# Patient Record
Sex: MALE | Race: WHITE | NOT HISPANIC OR LATINO | Employment: UNEMPLOYED | URBAN - METROPOLITAN AREA
[De-identification: names, ages, dates, MRNs, and addresses within clinical notes are randomized per-mention and may not be internally consistent; named-entity substitution may affect disease eponyms.]

---

## 2018-05-07 ENCOUNTER — HISTORICAL (OUTPATIENT)
Dept: LAB | Facility: HOSPITAL | Age: 53
End: 2018-05-07

## 2018-05-07 LAB
CHOLEST SERPL-MCNC: 190 MG/DL (ref 50–200)
CHOLEST/HDLC SERPL: 3 {RATIO} (ref 0–5)
HDLC SERPL-MCNC: 57 MG/DL (ref 35–60)
LDLC SERPL CALC-MCNC: 109 MG/DL (ref 50–140)
TRIGL SERPL-MCNC: 118 MG/DL (ref 30–150)
TSH SERPL-ACNC: 0.91 MIU/ML (ref 0.35–3.75)
VLDLC SERPL CALC-MCNC: 24 MG/DL

## 2019-04-15 ENCOUNTER — HISTORICAL (OUTPATIENT)
Dept: LAB | Facility: HOSPITAL | Age: 54
End: 2019-04-15

## 2019-04-15 LAB
ALBUMIN SERPL-MCNC: 3.8 GM/DL (ref 3.4–5)
ALBUMIN/GLOB SERPL: 1.1 RATIO (ref 1.1–2)
ALP SERPL-CCNC: 102 UNIT/L (ref 46–116)
ALT SERPL-CCNC: 37 UNIT/L (ref 12–78)
AST SERPL-CCNC: 22 UNIT/L (ref 10–37)
BILIRUB SERPL-MCNC: 0.3 MG/DL (ref 0.2–1)
BILIRUBIN DIRECT+TOT PNL SERPL-MCNC: 0.09 MG/DL (ref 0–0.2)
BILIRUBIN DIRECT+TOT PNL SERPL-MCNC: 0.21 MG/DL
BUN SERPL-MCNC: 11 MG/DL (ref 7–18)
CALCIUM SERPL-MCNC: 8.8 MG/DL (ref 8.5–10.1)
CHLORIDE SERPL-SCNC: 104 MMOL/L (ref 98–107)
CHOLEST SERPL-MCNC: 131 MG/DL (ref 50–200)
CHOLEST/HDLC SERPL: 3 {RATIO} (ref 0–5)
CO2 SERPL-SCNC: 31.5 MMOL/L (ref 21–32)
CREAT SERPL-MCNC: 1.06 MG/DL (ref 0.7–1.3)
GLOBULIN SER-MCNC: 3.6 GM/DL (ref 2.4–3.5)
GLUCOSE SERPL-MCNC: 111 MG/DL (ref 74–106)
HDLC SERPL-MCNC: 49 MG/DL (ref 35–60)
LDLC SERPL CALC-MCNC: 59 MG/DL (ref 50–140)
POTASSIUM SERPL-SCNC: 3.9 MMOL/L (ref 3.5–5.1)
PROT SERPL-MCNC: 7.4 GM/DL (ref 6.4–8.2)
SODIUM SERPL-SCNC: 140 MMOL/L (ref 136–145)
TRIGL SERPL-MCNC: 117 MG/DL (ref 30–150)
TSH SERPL-ACNC: 0.8 MIU/ML (ref 0.35–3.75)
VLDLC SERPL CALC-MCNC: 23 MG/DL

## 2020-09-16 ENCOUNTER — HISTORICAL (OUTPATIENT)
Dept: LAB | Facility: HOSPITAL | Age: 55
End: 2020-09-16

## 2020-09-16 LAB
ALBUMIN SERPL-MCNC: 3.4 GM/DL (ref 3.5–5)
ALBUMIN/GLOB SERPL: 1.1 RATIO (ref 1.1–2)
ALP SERPL-CCNC: 119 UNIT/L (ref 40–150)
ALT SERPL-CCNC: 13 UNIT/L (ref 0–55)
AST SERPL-CCNC: 12 UNIT/L (ref 5–34)
BILIRUB SERPL-MCNC: 0.2 MG/DL
BILIRUBIN DIRECT+TOT PNL SERPL-MCNC: 0.1 MG/DL (ref 0–0.5)
BILIRUBIN DIRECT+TOT PNL SERPL-MCNC: 0.1 MG/DL (ref 0–0.8)
BUN SERPL-MCNC: 18 MG/DL (ref 8.4–25.7)
CALCIUM SERPL-MCNC: 9.2 MG/DL (ref 8.4–10.2)
CHLORIDE SERPL-SCNC: 102 MMOL/L (ref 98–107)
CO2 SERPL-SCNC: 28 MMOL/L (ref 22–29)
CREAT SERPL-MCNC: 0.85 MG/DL (ref 0.73–1.18)
GLOBULIN SER-MCNC: 3.1 GM/DL (ref 2.4–3.5)
GLUCOSE SERPL-MCNC: 97 MG/DL (ref 74–100)
POTASSIUM SERPL-SCNC: 3.3 MMOL/L (ref 3.5–5.1)
PROT SERPL-MCNC: 6.5 GM/DL (ref 6.4–8.3)
SODIUM SERPL-SCNC: 139 MMOL/L (ref 136–145)

## 2020-12-07 ENCOUNTER — HISTORICAL (OUTPATIENT)
Dept: ADMINISTRATIVE | Facility: HOSPITAL | Age: 55
End: 2020-12-07

## 2022-04-11 ENCOUNTER — HISTORICAL (OUTPATIENT)
Dept: ADMINISTRATIVE | Facility: HOSPITAL | Age: 57
End: 2022-04-11

## 2022-04-24 VITALS — WEIGHT: 160 LBS | BODY MASS INDEX: 24.25 KG/M2 | HEIGHT: 68 IN

## 2022-05-04 NOTE — HISTORICAL [facility]
This is a historical note converted from [employer]. Formatting and pictures may have been removed.  Please reference [employer] for original formatting and attached multimedia. Date/Time:?12/7/2020 08:33:06  Procedure:?Left?Carpal tunnel injection  Indications:?Therapeutic Indication- decrease pain, increase range of motion and improve quality of life.  RISKS: Possible complications with injection include bleeding, infection (0.01%), tendon rupture, steroid flare, fat pad or soft tissue atrophy, skin depigmentation, and vasovagal response. ( steroid flare treatment is rest, ice, NSAIDS and resolves in 24-36 hrs.)  Consent: Procedure, risks, benefits, and alternatives explained to patient, who voiced understanding and agreed to proceed with procedure. Consent signed and scanned into the medical record. No absolute contraindications ( cellulitis overlying joint, infection, lack of informed consent, allergy to injection medication, molly protein or egg allergy for sodium hyaluronate, or history of steroid flare) or relative contraindications ( brittle or out of control DM HgA1c >10, coagulopathy INR > 3.5, previous joint replacement, or history of AVN.)  Staff:?Dr. Ameya Mei  Description: Time out performed. The patient was prepped using Chlorhexidine scrub after the appropriate?identification of anatomic landmarks.?Sterile needle used (?21 gauge, 1.5 inch)?Topical anesthetic of ethyl chloride was used?5 ml of 1% lidocaine plain with 40 mg of Kenalog injected.  Complications: none  EBL: none  Post procedure:?Patient reports improvement in pain and ROM.Patient is alert, moving all extremities. Good peripheral pulses, no signs of vascular compromise, range of motion intact. Patient tolerated procedure well. Aftercare instructions were given to patient at time of discharge. Relative rest for 3 days- avoiding excessive activity. Place ice on the are for 15 minutes every 4 to 6 hours. Tylenol 1000 mg twice a day or ibuprofen  600 mg three times a day for the next 3-4 days if not on the medications already. Protect the area for the next 1-8 hours if anesthetic was used. Avoid excessive activity for next 3 to 4 weeks. ER precautions for fever, severe joint pain or allergic reaction to other new symptoms related to joint injection.?  ?   Jaiden Dillon?was evaluated at the time of the encounter with?Dr Roberson.? HPI, PE and treatment plan was reviewed.? Treatment plan was reasonable and necessary.??Imaging was reviewed at the time of visit.??

## 2022-05-04 NOTE — HISTORICAL [facility]
This is a historical note converted from [employer]. Formatting and pictures may have been removed.  Please reference [employer] for original formatting and attached multimedia. Chief Complaint  Left hand pain  History of Present Illness  Chief complaint:  Left hand pain  ?  History present illness:  55-year-old right-hand-dominant male?who states approximately a year ago?was using a pick ax to bust up some limestone. ?After that time?he began having?left hand?wrist pain with associated numbness?extending into his palm?thumb index?and long fingers. ?Patient states the pain is been?unremitting?and consistent. ?Nothing seems to make it better or worse.? Patient denies?motor weakness.? Patient Dors is pain?when he pushes on his wrist?volar aspect?at the carpal tunnel.  Review of Systems  Constitutional:?no fever, fatigue, weakness  Eye:?no vision loss, eye redness, drainage, or pain  ENMT:?no sore throat, ear pain, sinus pain/congestion, nasal congestion/drainage  Respiratory:?no cough, no wheezing, no shortness of breath  Cardiovascular:?no chest pain, no palpitations, no edema  Gastrointestinal:?no nausea, vomiting, or diarrhea. No abdominal pain  ?  ?  Physical Exam  Vitals & Measurements  HT:?172.00?cm? WT:?72.570?kg? BMI:?24.53?  GEN: well developed; well nourished, NAD  HEENT: NCAT  RESP: Equal rise and fall of chest; No increased WOB on RA  CV: RRR by peripheral pulse  NEURO: AAOX3; cooperative, GCS 15  Psych: Appropriate Affect  LUE:  Skin is intact  Pain with compression at the carpal tunnel  Tinels positive  Tinels at the elbow is negative  Motor is grossly intact  Sensation is intact to light touch?possibly diminished?to two-point discrimination?over?index and long fingers compared to contralateral side  Warm well-perfused with brisk capillary refill  ?   Imaging:  X-ray left hand/wrist: X-ray left hand and wrist?demonstrate no acute osseous abnormalities. ?No subluxation dislocation.  Assessment/Plan  Pain in left  hand?M79.642  ?Patient has physicals and findings as well as?chief complaint consistent with carpal tunnel syndrome.? We will attempt injection left carpal tunnel today in clinic. ?We will bring the patient back in?6 to 8 weeks?as needed to assess if the injection?has been beneficial or not. ?This will be both diagnostic and therapeutic.? If the injection helps patient can?receive another injection at a later date?when?his pain returns.? If patient does not want to continue with injections discussion can be had with patient regards to operative?and continued nonoperative management.  ?  ?  Jaiden Dillon?was evaluated at the time of the encounter with?Dr Roberson.? HPI, PE and treatment plan was reviewed.? Treatment plan was reasonable and necessary.??Imaging was reviewed at the time of visit.??  ?  Ordered:  Lidocaine inj., 10 mL, form: Soln, Intra-Articular, Once, first dose 12/07/20 8:28:00 CST, stop date 12/07/20 8:28:00 CST  triamcinolone, 40 mg, form: Injection, Intra-Articular, Once, first dose 12/07/20 8:28:00 CST, stop date 12/07/20 8:28:00 CST  Clinic Follow up, *Est. 01/18/21 3:00:00 CST, Order for future visit, Pain in left hand, [facility]  Clinic Follow-up PRN, 12/07/20 8:28:00 CST  Office/Outpatient Visit Level 3 New [address] PC, Pain in left hand, [facility], 12/07/20 8:28:00 CST  XR Hand Left Minimum 3 Views, Routine, 12/07/20 8:19:00 CST, None, Wheelchair, Patient Has IV?, Rad Type, Pain in left hand, Not Scheduled, 12/07/20 8:19:00 CST  ?  Pain in right hand?M79.641  ?  Referrals  Clinic Follow up, *Est. 01/18/21 3:00:00 CST, Order for future visit, Pain in left hand, [facility]  Clinic Follow-up PRN, 12/07/20 8:28:00 CST   Problem List/Past Medical History  Ongoing  Cardiac valve flow  Diabetes  HTN (hypertension)  Hyperlipidemia  Nephrolithiasis  Renal stone  Tobacco user  Historical  No qualifying data  Procedure/Surgical History  Application of short arm splint (forearm to hand);  static (08/16/2016)  Immobilization of Right Hand using Splint (08/16/2016)  Cystoscopy w/ Ureteral Stent (05/21/2016)  Dilation of Right Ureter with Intraluminal Device, Via Natural or Artificial Opening Endoscopic (05/21/2016)  Fluoroscopy of Right Kidney, Ureter and Bladder using High Osmolar Contrast (05/21/2016)  Gallbladder  inguinal hernia repair   Medications  amlodipine 10 mg oral tablet, 10 mg= 1 tab(s), Oral, Daily  aspirin 81 mg oral tablet  clonazePAM 1 mg oral tablet, 1 mg= 1 tab(s), Oral, BID  fluticasone 50 mcg/inh nasal spray, 1 spray(s), Daily  hydrochlorothiazide-lisinopril 25 mg-20 mg oral tablet, 1 tab(s), Oral, Daily  Kenalog-40 injectable suspension, 40 mg= 1 mL, Intra-Articular, Once  Lidocaine 1% 10ml inj, 10 mL, Intra-Articular, Once  lisinopril 40 mg oral tablet, 1/2 tablet, Oral, Daily  mirtazapine 30 mg oral tablet, 30 mg= 1 tab(s), Oral, qPM  Pantoprazole 40 mg ORAL EC-Tablet, 40 mg= 1 tab(s), Oral, Daily  prednisONE 20 mg oral tablet, 20 mg= 1 tab(s), Oral, BID  sertraline 50 mg oral tablet, 50 mg= 1 tab(s), Oral, Daily  simvastatin 20 mg oral tablet, At Bedtime  tamsulosin 0.4 mg oral capsule, At Bedtime  traMADol 50 mg oral tablet, 50 mg= 1 tab(s), Oral, q6hr  Zoloft 50 mg oral tablet, 50 mg= 1 tab(s), Oral, qAM  Allergies  traMADol?(Lightheadedness)  sulfADIAZINE?(Hives)  Social History  Abuse/Neglect  No, No, Yes, 12/07/2020  Alcohol  Never, 05/19/2016  Substance Use  Past, 08/15/2017  Current, Marijuana, Prescription medications, 1-2 times per year, 05/19/2016  Tobacco  10 or more cigarettes (1/2 pack or more)/day in last 30 days, No, 12/07/2020

## 2023-09-17 ENCOUNTER — HOSPITAL ENCOUNTER (INPATIENT)
Facility: HOSPITAL | Age: 58
LOS: 3 days | Discharge: HOME | DRG: 683 | End: 2023-09-20
Attending: EMERGENCY MEDICINE | Admitting: INTERNAL MEDICINE
Payer: MEDICAID

## 2023-09-17 DIAGNOSIS — N17.9 AKI (ACUTE KIDNEY INJURY): Primary | ICD-10-CM

## 2023-09-17 DIAGNOSIS — R07.9 CHEST PAIN: ICD-10-CM

## 2023-09-17 PROCEDURE — 11000001 HC ACUTE MED/SURG PRIVATE ROOM

## 2023-09-17 PROCEDURE — 99285 EMERGENCY DEPT VISIT HI MDM: CPT

## 2023-09-18 LAB
ALBUMIN SERPL-MCNC: 4.7 G/DL (ref 3.4–4.8)
ALBUMIN/GLOB SERPL: 1.2 RATIO (ref 1.1–2)
ALP SERPL-CCNC: 155 UNIT/L (ref 40–150)
ALT SERPL-CCNC: 40 UNIT/L (ref 0–55)
AMPHET UR QL SCN: POSITIVE
APPEARANCE UR: CLEAR
AST SERPL-CCNC: 25 UNIT/L (ref 5–34)
BACTERIA #/AREA URNS AUTO: NORMAL /HPF
BARBITURATE SCN PRESENT UR: NEGATIVE
BASOPHILS # BLD AUTO: 0.03 X10(3)/MCL
BASOPHILS NFR BLD AUTO: 0.3 %
BENZODIAZ UR QL SCN: POSITIVE
BILIRUB SERPL-MCNC: 0.6 MG/DL
BILIRUB UR QL STRIP.AUTO: NEGATIVE
BNP BLD-MCNC: <10 PG/ML
BUN SERPL-MCNC: 45.6 MG/DL (ref 8.4–25.7)
C3 SERPL-MCNC: 124 MG/DL (ref 80–173)
C4 SERPL-MCNC: 48 MG/DL (ref 13–46)
CALCIUM SERPL-MCNC: 9.8 MG/DL (ref 8.8–10)
CANNABINOIDS UR QL SCN: POSITIVE
CHLORIDE SERPL-SCNC: 96 MMOL/L (ref 98–107)
CK SERPL-CCNC: 278 U/L (ref 30–200)
CO2 SERPL-SCNC: 24 MMOL/L (ref 23–31)
COCAINE UR QL SCN: NEGATIVE
COLOR UR: YELLOW
CREAT SERPL-MCNC: 4.11 MG/DL (ref 0.73–1.18)
CREAT UR-MCNC: 110 MG/DL (ref 63–166)
CREAT UR-MCNC: 114.8 MG/DL (ref 63–166)
EOSINOPHIL # BLD AUTO: 0.06 X10(3)/MCL (ref 0–0.9)
EOSINOPHIL NFR BLD AUTO: 0.6 %
ERYTHROCYTE [DISTWIDTH] IN BLOOD BY AUTOMATED COUNT: 13.2 % (ref 11.5–17)
ETHANOL SERPL-MCNC: <10 MG/DL
FENTANYL UR QL SCN: NEGATIVE
GFR SERPLBLD CREATININE-BSD FMLA CKD-EPI: 16 MLS/MIN/1.73/M2
GLOBULIN SER-MCNC: 4 GM/DL (ref 2.4–3.5)
GLUCOSE SERPL-MCNC: 110 MG/DL (ref 82–115)
GLUCOSE UR QL STRIP.AUTO: ABNORMAL
HAV IGM SERPL QL IA: NONREACTIVE
HBV CORE IGM SERPL QL IA: NONREACTIVE
HBV SURFACE AG SERPL QL IA: NONREACTIVE
HCT VFR BLD AUTO: 40.1 % (ref 42–52)
HCV AB SERPL QL IA: NONREACTIVE
HGB BLD-MCNC: 14.1 G/DL (ref 14–18)
HIV 1+2 AB+HIV1 P24 AG SERPL QL IA: NONREACTIVE
IMM GRANULOCYTES # BLD AUTO: 0.04 X10(3)/MCL (ref 0–0.04)
IMM GRANULOCYTES NFR BLD AUTO: 0.4 %
KETONES UR QL STRIP.AUTO: NEGATIVE
LEUKOCYTE ESTERASE UR QL STRIP.AUTO: NEGATIVE
LYMPHOCYTES # BLD AUTO: 2.87 X10(3)/MCL (ref 0.6–4.6)
LYMPHOCYTES NFR BLD AUTO: 30.3 %
MCH RBC QN AUTO: 30.7 PG (ref 27–31)
MCHC RBC AUTO-ENTMCNC: 35.2 G/DL (ref 33–36)
MCV RBC AUTO: 87.2 FL (ref 80–94)
MDMA UR QL SCN: NEGATIVE
MONOCYTES # BLD AUTO: 1.02 X10(3)/MCL (ref 0.1–1.3)
MONOCYTES NFR BLD AUTO: 10.8 %
NEUTROPHILS # BLD AUTO: 5.46 X10(3)/MCL (ref 2.1–9.2)
NEUTROPHILS NFR BLD AUTO: 57.6 %
NITRITE UR QL STRIP.AUTO: NEGATIVE
NRBC BLD AUTO-RTO: 0 %
OPIATES UR QL SCN: NEGATIVE
OSMOLALITY UR: 334 MOSM/KG (ref 300–1300)
PCP UR QL: NEGATIVE
PH UR STRIP.AUTO: 5.5 [PH]
PH UR: 5.5 [PH] (ref 3–11)
PLATELET # BLD AUTO: 275 X10(3)/MCL (ref 130–400)
PMV BLD AUTO: 10.6 FL (ref 7.4–10.4)
POCT GLUCOSE: 166 MG/DL (ref 70–110)
POCT GLUCOSE: 83 MG/DL (ref 70–110)
POTASSIUM SERPL-SCNC: 3.4 MMOL/L (ref 3.5–5.1)
PROT SERPL-MCNC: 8.7 GM/DL (ref 5.8–7.6)
PROT UR QL STRIP.AUTO: ABNORMAL
PROT UR STRIP-MCNC: 17.8 MG/DL
RBC # BLD AUTO: 4.6 X10(6)/MCL (ref 4.7–6.1)
RBC #/AREA URNS AUTO: <5 /HPF
RBC UR QL AUTO: NEGATIVE
SODIUM SERPL-SCNC: 135 MMOL/L (ref 136–145)
SODIUM UR-SCNC: 62 MMOL/L
SP GR UR STRIP.AUTO: 1.01 (ref 1–1.03)
SQUAMOUS #/AREA URNS AUTO: <5 /HPF
TROPONIN I SERPL-MCNC: <0.01 NG/ML (ref 0–0.04)
URINE PROTEIN/CREATININE RATIO (OHS): 0.2
UROBILINOGEN UR STRIP-ACNC: 0.2
WBC # SPEC AUTO: 9.48 X10(3)/MCL (ref 4.5–11.5)
WBC #/AREA URNS AUTO: <5 /HPF

## 2023-09-18 PROCEDURE — 96372 THER/PROPH/DIAG INJ SC/IM: CPT | Performed by: EMERGENCY MEDICINE

## 2023-09-18 PROCEDURE — 21400001 HC TELEMETRY ROOM

## 2023-09-18 PROCEDURE — 63600175 PHARM REV CODE 636 W HCPCS: Performed by: NURSE PRACTITIONER

## 2023-09-18 PROCEDURE — 84484 ASSAY OF TROPONIN QUANT: CPT | Performed by: PHYSICIAN ASSISTANT

## 2023-09-18 PROCEDURE — 83935 ASSAY OF URINE OSMOLALITY: CPT | Performed by: NURSE PRACTITIONER

## 2023-09-18 PROCEDURE — 93005 ELECTROCARDIOGRAM TRACING: CPT

## 2023-09-18 PROCEDURE — 84484 ASSAY OF TROPONIN QUANT: CPT | Performed by: EMERGENCY MEDICINE

## 2023-09-18 PROCEDURE — 93010 ELECTROCARDIOGRAM REPORT: CPT | Mod: ,,, | Performed by: STUDENT IN AN ORGANIZED HEALTH CARE EDUCATION/TRAINING PROGRAM

## 2023-09-18 PROCEDURE — 84300 ASSAY OF URINE SODIUM: CPT | Performed by: NURSE PRACTITIONER

## 2023-09-18 PROCEDURE — 93010 EKG 12-LEAD: ICD-10-PCS | Mod: ,,, | Performed by: STUDENT IN AN ORGANIZED HEALTH CARE EDUCATION/TRAINING PROGRAM

## 2023-09-18 PROCEDURE — 86160 COMPLEMENT ANTIGEN: CPT | Performed by: NURSE PRACTITIONER

## 2023-09-18 PROCEDURE — 80074 ACUTE HEPATITIS PANEL: CPT | Performed by: NURSE PRACTITIONER

## 2023-09-18 PROCEDURE — 82570 ASSAY OF URINE CREATININE: CPT | Performed by: NURSE PRACTITIONER

## 2023-09-18 PROCEDURE — 96360 HYDRATION IV INFUSION INIT: CPT

## 2023-09-18 PROCEDURE — 63600175 PHARM REV CODE 636 W HCPCS: Performed by: EMERGENCY MEDICINE

## 2023-09-18 PROCEDURE — 27000207 HC ISOLATION

## 2023-09-18 PROCEDURE — 80307 DRUG TEST PRSMV CHEM ANLYZR: CPT | Performed by: EMERGENCY MEDICINE

## 2023-09-18 PROCEDURE — 87389 HIV-1 AG W/HIV-1&-2 AB AG IA: CPT | Performed by: NURSE PRACTITIONER

## 2023-09-18 PROCEDURE — 83880 ASSAY OF NATRIURETIC PEPTIDE: CPT | Performed by: EMERGENCY MEDICINE

## 2023-09-18 PROCEDURE — 82077 ASSAY SPEC XCP UR&BREATH IA: CPT | Performed by: EMERGENCY MEDICINE

## 2023-09-18 PROCEDURE — 81001 URINALYSIS AUTO W/SCOPE: CPT | Performed by: EMERGENCY MEDICINE

## 2023-09-18 PROCEDURE — 25000003 PHARM REV CODE 250: Performed by: PHYSICIAN ASSISTANT

## 2023-09-18 PROCEDURE — 85025 COMPLETE CBC W/AUTO DIFF WBC: CPT | Performed by: EMERGENCY MEDICINE

## 2023-09-18 PROCEDURE — 82550 ASSAY OF CK (CPK): CPT | Performed by: EMERGENCY MEDICINE

## 2023-09-18 PROCEDURE — 11000001 HC ACUTE MED/SURG PRIVATE ROOM

## 2023-09-18 PROCEDURE — 80053 COMPREHEN METABOLIC PANEL: CPT | Performed by: EMERGENCY MEDICINE

## 2023-09-18 RX ORDER — TALC
6 POWDER (GRAM) TOPICAL NIGHTLY PRN
Status: DISCONTINUED | OUTPATIENT
Start: 2023-09-18 | End: 2023-09-20 | Stop reason: HOSPADM

## 2023-09-18 RX ORDER — SIMETHICONE 80 MG
1 TABLET,CHEWABLE ORAL 4 TIMES DAILY PRN
Status: DISCONTINUED | OUTPATIENT
Start: 2023-09-18 | End: 2023-09-20 | Stop reason: HOSPADM

## 2023-09-18 RX ORDER — GLUCAGON 1 MG
1 KIT INJECTION
Status: DISCONTINUED | OUTPATIENT
Start: 2023-09-18 | End: 2023-09-20 | Stop reason: HOSPADM

## 2023-09-18 RX ORDER — CLONAZEPAM 1 MG/1
1 TABLET ORAL 2 TIMES DAILY
COMMUNITY

## 2023-09-18 RX ORDER — ONDANSETRON 2 MG/ML
4 INJECTION INTRAMUSCULAR; INTRAVENOUS EVERY 4 HOURS PRN
Status: DISCONTINUED | OUTPATIENT
Start: 2023-09-18 | End: 2023-09-20 | Stop reason: HOSPADM

## 2023-09-18 RX ORDER — SILDENAFIL 50 MG/1
50 TABLET, FILM COATED ORAL DAILY PRN
COMMUNITY

## 2023-09-18 RX ORDER — MAG HYDROX/ALUMINUM HYD/SIMETH 200-200-20
30 SUSPENSION, ORAL (FINAL DOSE FORM) ORAL 4 TIMES DAILY PRN
Status: DISCONTINUED | OUTPATIENT
Start: 2023-09-18 | End: 2023-09-20 | Stop reason: HOSPADM

## 2023-09-18 RX ORDER — POLYETHYLENE GLYCOL 3350 17 G/17G
17 POWDER, FOR SOLUTION ORAL 2 TIMES DAILY PRN
Status: DISCONTINUED | OUTPATIENT
Start: 2023-09-18 | End: 2023-09-20 | Stop reason: HOSPADM

## 2023-09-18 RX ORDER — SODIUM CHLORIDE, SODIUM LACTATE, POTASSIUM CHLORIDE, CALCIUM CHLORIDE 600; 310; 30; 20 MG/100ML; MG/100ML; MG/100ML; MG/100ML
INJECTION, SOLUTION INTRAVENOUS CONTINUOUS
Status: DISCONTINUED | OUTPATIENT
Start: 2023-09-18 | End: 2023-09-20 | Stop reason: HOSPADM

## 2023-09-18 RX ORDER — ATORVASTATIN CALCIUM 20 MG/1
20 TABLET, FILM COATED ORAL NIGHTLY
COMMUNITY

## 2023-09-18 RX ORDER — ASPIRIN 81 MG/1
81 TABLET ORAL DAILY
COMMUNITY

## 2023-09-18 RX ORDER — NALOXONE HCL 0.4 MG/ML
0.02 VIAL (ML) INJECTION
Status: DISCONTINUED | OUTPATIENT
Start: 2023-09-18 | End: 2023-09-20 | Stop reason: HOSPADM

## 2023-09-18 RX ORDER — IBUPROFEN 200 MG
16 TABLET ORAL
Status: DISCONTINUED | OUTPATIENT
Start: 2023-09-18 | End: 2023-09-20 | Stop reason: HOSPADM

## 2023-09-18 RX ORDER — LISINOPRIL AND HYDROCHLOROTHIAZIDE 20; 25 MG/1; MG/1
1 TABLET ORAL DAILY
Status: ON HOLD | COMMUNITY
End: 2023-09-20 | Stop reason: HOSPADM

## 2023-09-18 RX ORDER — IBUPROFEN 200 MG
24 TABLET ORAL
Status: DISCONTINUED | OUTPATIENT
Start: 2023-09-18 | End: 2023-09-20 | Stop reason: HOSPADM

## 2023-09-18 RX ORDER — POTASSIUM CHLORIDE 20 MEQ/1
20 TABLET, EXTENDED RELEASE ORAL ONCE
Status: COMPLETED | OUTPATIENT
Start: 2023-09-18 | End: 2023-09-18

## 2023-09-18 RX ORDER — INSULIN ASPART 100 [IU]/ML
0-10 INJECTION, SOLUTION INTRAVENOUS; SUBCUTANEOUS
Status: DISCONTINUED | OUTPATIENT
Start: 2023-09-18 | End: 2023-09-20 | Stop reason: HOSPADM

## 2023-09-18 RX ORDER — SERTRALINE HYDROCHLORIDE 50 MG/1
50 TABLET, FILM COATED ORAL DAILY
COMMUNITY

## 2023-09-18 RX ORDER — ACETAMINOPHEN 325 MG/1
650 TABLET ORAL EVERY 6 HOURS PRN
Status: DISCONTINUED | OUTPATIENT
Start: 2023-09-18 | End: 2023-09-20 | Stop reason: HOSPADM

## 2023-09-18 RX ORDER — MIRTAZAPINE 30 MG/1
30 TABLET, FILM COATED ORAL NIGHTLY
COMMUNITY

## 2023-09-18 RX ORDER — PROCHLORPERAZINE EDISYLATE 5 MG/ML
5 INJECTION INTRAMUSCULAR; INTRAVENOUS EVERY 6 HOURS PRN
Status: DISCONTINUED | OUTPATIENT
Start: 2023-09-18 | End: 2023-09-20 | Stop reason: HOSPADM

## 2023-09-18 RX ORDER — ZIPRASIDONE MESYLATE 20 MG/ML
20 INJECTION, POWDER, LYOPHILIZED, FOR SOLUTION INTRAMUSCULAR
Status: COMPLETED | OUTPATIENT
Start: 2023-09-18 | End: 2023-09-18

## 2023-09-18 RX ADMIN — SODIUM CHLORIDE, POTASSIUM CHLORIDE, SODIUM LACTATE AND CALCIUM CHLORIDE: 600; 310; 30; 20 INJECTION, SOLUTION INTRAVENOUS at 04:09

## 2023-09-18 RX ADMIN — POTASSIUM CHLORIDE 20 MEQ: 1500 TABLET, EXTENDED RELEASE ORAL at 11:09

## 2023-09-18 RX ADMIN — SODIUM CHLORIDE, POTASSIUM CHLORIDE, SODIUM LACTATE AND CALCIUM CHLORIDE 1000 ML: 600; 310; 30; 20 INJECTION, SOLUTION INTRAVENOUS at 02:09

## 2023-09-18 RX ADMIN — SODIUM CHLORIDE, POTASSIUM CHLORIDE, SODIUM LACTATE AND CALCIUM CHLORIDE 1000 ML: 600; 310; 30; 20 INJECTION, SOLUTION INTRAVENOUS at 01:09

## 2023-09-18 RX ADMIN — ZIPRASIDONE MESYLATE 20 MG: 20 INJECTION, POWDER, LYOPHILIZED, FOR SOLUTION INTRAMUSCULAR at 12:09

## 2023-09-18 RX ADMIN — SODIUM CHLORIDE, POTASSIUM CHLORIDE, SODIUM LACTATE AND CALCIUM CHLORIDE: 600; 310; 30; 20 INJECTION, SOLUTION INTRAVENOUS at 06:09
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
## 2023-09-18 NOTE — NURSING
Nurses Note -- 4 Eyes      9/18/2023   6:12 PM      Skin assessed during: Admit      [x] No Altered Skin Integrity Present    []Prevention Measures Documented      [] Yes- Altered Skin Integrity Present or Discovered   [] LDA Added if Not in Epic (Describe Wound)   [] New Altered Skin Integrity was Present on Admit and Documented in LDA   [] Wound Image Taken    Wound Care Consulted? No    Attending Nurse:  Christianne Abdul RN    Second RN/Staff Member:   Jeremy Pang RN

## 2023-09-19 LAB
ALBUMIN SERPL-MCNC: 3.3 G/DL (ref 3.4–4.8)
ALBUMIN/GLOB SERPL: 1.3 RATIO (ref 1.1–2)
ALP SERPL-CCNC: 108 UNIT/L (ref 40–150)
ALT SERPL-CCNC: 25 UNIT/L (ref 0–55)
AST SERPL-CCNC: 19 UNIT/L (ref 5–34)
BASOPHILS # BLD AUTO: 0.04 X10(3)/MCL
BASOPHILS NFR BLD AUTO: 0.6 %
BILIRUB SERPL-MCNC: 0.6 MG/DL
BUN SERPL-MCNC: 23.6 MG/DL (ref 8.4–25.7)
CALCIUM SERPL-MCNC: 9.1 MG/DL (ref 8.8–10)
CHLORIDE SERPL-SCNC: 104 MMOL/L (ref 98–107)
CLOSTRIDIUM DIFFICILE GDH ANTIGEN (OHS): NEGATIVE
CLOSTRIDIUM DIFFICILE TOXIN A/B (OHS): NEGATIVE
CO2 SERPL-SCNC: 29 MMOL/L (ref 23–31)
COLOR STL: NORMAL
CONSISTENCY STL: NORMAL
CREAT SERPL-MCNC: 0.87 MG/DL (ref 0.73–1.18)
EOSINOPHIL # BLD AUTO: 0.13 X10(3)/MCL (ref 0–0.9)
EOSINOPHIL NFR BLD AUTO: 2 %
ERYTHROCYTE [DISTWIDTH] IN BLOOD BY AUTOMATED COUNT: 13.4 % (ref 11.5–17)
FECAL LEUKOCYTE (OHS): NEGATIVE
GFR SERPLBLD CREATININE-BSD FMLA CKD-EPI: >60 MLS/MIN/1.73/M2
GLOBULIN SER-MCNC: 2.5 GM/DL (ref 2.4–3.5)
GLUCOSE SERPL-MCNC: 97 MG/DL (ref 82–115)
HCT VFR BLD AUTO: 33.4 % (ref 42–52)
HEMOCCULT SP1 STL QL: NEGATIVE
HGB BLD-MCNC: 11.3 G/DL (ref 14–18)
IMM GRANULOCYTES # BLD AUTO: 0.02 X10(3)/MCL (ref 0–0.04)
IMM GRANULOCYTES NFR BLD AUTO: 0.3 %
LYMPHOCYTES # BLD AUTO: 2.31 X10(3)/MCL (ref 0.6–4.6)
LYMPHOCYTES NFR BLD AUTO: 34.8 %
MAGNESIUM SERPL-MCNC: 1.6 MG/DL (ref 1.6–2.6)
MCH RBC QN AUTO: 30.2 PG (ref 27–31)
MCHC RBC AUTO-ENTMCNC: 33.8 G/DL (ref 33–36)
MCV RBC AUTO: 89.3 FL (ref 80–94)
MONOCYTES # BLD AUTO: 0.5 X10(3)/MCL (ref 0.1–1.3)
MONOCYTES NFR BLD AUTO: 7.5 %
NEUTROPHILS # BLD AUTO: 3.64 X10(3)/MCL (ref 2.1–9.2)
NEUTROPHILS NFR BLD AUTO: 54.8 %
NRBC BLD AUTO-RTO: 0 %
PHOSPHATE SERPL-MCNC: 2.5 MG/DL (ref 2.3–4.7)
PLATELET # BLD AUTO: 217 X10(3)/MCL (ref 130–400)
PMV BLD AUTO: 11.4 FL (ref 7.4–10.4)
POCT GLUCOSE: 107 MG/DL (ref 70–110)
POCT GLUCOSE: 111 MG/DL (ref 70–110)
POCT GLUCOSE: 85 MG/DL (ref 70–110)
POCT GLUCOSE: 96 MG/DL (ref 70–110)
POTASSIUM SERPL-SCNC: 4.3 MMOL/L (ref 3.5–5.1)
PROT SERPL-MCNC: 5.8 GM/DL (ref 5.8–7.6)
RBC # BLD AUTO: 3.74 X10(6)/MCL (ref 4.7–6.1)
SODIUM SERPL-SCNC: 139 MMOL/L (ref 136–145)
WBC # SPEC AUTO: 6.64 X10(3)/MCL (ref 4.5–11.5)

## 2023-09-19 PROCEDURE — 25000003 PHARM REV CODE 250: Performed by: INTERNAL MEDICINE

## 2023-09-19 PROCEDURE — 85025 COMPLETE CBC W/AUTO DIFF WBC: CPT | Performed by: NURSE PRACTITIONER

## 2023-09-19 PROCEDURE — 21400001 HC TELEMETRY ROOM

## 2023-09-19 PROCEDURE — 83735 ASSAY OF MAGNESIUM: CPT | Performed by: NURSE PRACTITIONER

## 2023-09-19 PROCEDURE — 63600175 PHARM REV CODE 636 W HCPCS: Performed by: INTERNAL MEDICINE

## 2023-09-19 PROCEDURE — 27000207 HC ISOLATION

## 2023-09-19 PROCEDURE — 89055 LEUKOCYTE ASSESSMENT FECAL: CPT | Performed by: PHYSICIAN ASSISTANT

## 2023-09-19 PROCEDURE — 86318 IA INFECTIOUS AGENT ANTIBODY: CPT | Performed by: PHYSICIAN ASSISTANT

## 2023-09-19 PROCEDURE — 84100 ASSAY OF PHOSPHORUS: CPT | Performed by: NURSE PRACTITIONER

## 2023-09-19 PROCEDURE — 82272 OCCULT BLD FECES 1-3 TESTS: CPT | Performed by: PHYSICIAN ASSISTANT

## 2023-09-19 PROCEDURE — 25000003 PHARM REV CODE 250: Performed by: NURSE PRACTITIONER

## 2023-09-19 PROCEDURE — 87045 FECES CULTURE AEROBIC BACT: CPT | Performed by: PHYSICIAN ASSISTANT

## 2023-09-19 PROCEDURE — 80053 COMPREHEN METABOLIC PANEL: CPT | Performed by: NURSE PRACTITIONER

## 2023-09-19 RX ORDER — CLONAZEPAM 1 MG/1
1 TABLET ORAL 2 TIMES DAILY
Status: DISCONTINUED | OUTPATIENT
Start: 2023-09-19 | End: 2023-09-20 | Stop reason: HOSPADM

## 2023-09-19 RX ORDER — ASPIRIN 81 MG/1
81 TABLET ORAL DAILY
Status: DISCONTINUED | OUTPATIENT
Start: 2023-09-20 | End: 2023-09-20 | Stop reason: HOSPADM

## 2023-09-19 RX ORDER — SERTRALINE HYDROCHLORIDE 50 MG/1
50 TABLET, FILM COATED ORAL DAILY
Status: DISCONTINUED | OUTPATIENT
Start: 2023-09-20 | End: 2023-09-20 | Stop reason: HOSPADM

## 2023-09-19 RX ORDER — ATORVASTATIN CALCIUM 10 MG/1
20 TABLET, FILM COATED ORAL NIGHTLY
Status: DISCONTINUED | OUTPATIENT
Start: 2023-09-19 | End: 2023-09-20 | Stop reason: HOSPADM

## 2023-09-19 RX ORDER — MIRTAZAPINE 15 MG/1
30 TABLET, FILM COATED ORAL NIGHTLY
Status: DISCONTINUED | OUTPATIENT
Start: 2023-09-19 | End: 2023-09-20 | Stop reason: HOSPADM

## 2023-09-19 RX ADMIN — ATORVASTATIN CALCIUM 20 MG: 10 TABLET, FILM COATED ORAL at 09:09

## 2023-09-19 RX ADMIN — SODIUM CHLORIDE, POTASSIUM CHLORIDE, SODIUM LACTATE AND CALCIUM CHLORIDE: 600; 310; 30; 20 INJECTION, SOLUTION INTRAVENOUS at 12:09

## 2023-09-19 RX ADMIN — SODIUM CHLORIDE, POTASSIUM CHLORIDE, SODIUM LACTATE AND CALCIUM CHLORIDE: 600; 310; 30; 20 INJECTION, SOLUTION INTRAVENOUS at 09:09

## 2023-09-19 RX ADMIN — MIRTAZAPINE 30 MG: 15 TABLET, FILM COATED ORAL at 09:09

## 2023-09-19 RX ADMIN — TRAMADOL HYDROCHLORIDE 25 MG: 50 TABLET, COATED ORAL at 12:09

## 2023-09-19 RX ADMIN — ALUMINUM HYDROXIDE, MAGNESIUM HYDROXIDE, AND SIMETHICONE 30 ML: 200; 200; 20 SUSPENSION ORAL at 12:09

## 2023-09-19 RX ADMIN — SODIUM CHLORIDE, POTASSIUM CHLORIDE, SODIUM LACTATE AND CALCIUM CHLORIDE: 600; 310; 30; 20 INJECTION, SOLUTION INTRAVENOUS at 10:09

## 2023-09-19 RX ADMIN — CLONAZEPAM 1 MG: 1 TABLET ORAL at 09:09

## 2023-09-19 NOTE — PLAN OF CARE
09/19/23 1348   Discharge Assessment   Assessment Type Discharge Planning Assessment   Confirmed/corrected address, phone number and insurance Yes   Confirmed Demographics Correct on Facesheet   Source of Information patient   Communicated JOSHUA with patient/caregiver Date not available/Unable to determine   Reason For Admission TORI, chest pain   People in Home significant other   Do you expect to return to your current living situation? Yes   Prior to hospitilization cognitive status: Alert/Oriented   Current cognitive status: Alert/Oriented   Home Accessibility stairs to enter home   Number of Stairs, Main Entrance four   Stair Railings, Main Entrance railings safe and in good condition   Home Layout Able to live on 1st floor   Equipment Currently Used at Home none   Readmission within 30 days? No   Patient currently being followed by outpatient case management? No   Do you currently have service(s) that help you manage your care at home? No   Do you take prescription medications? Yes   Do you have prescription coverage? Yes   Coverage Medicaid   Do you have any problems affording any of your prescribed medications? No   Is the patient taking medications as prescribed? yes   Who is going to help you get home at discharge? friend   How do you get to doctors appointments? car, drives self   Are you on dialysis? No   Do you take coumadin? No   DME Needed Upon Discharge  none   Discharge Plan discussed with: Patient   Transition of Care Barriers None   Discharge Plan A Home   Discharge Plan B Home   OTHER   Name(s) of People in Home Kim Yeh

## 2023-09-19 NOTE — PLAN OF CARE
Problem: Adult Inpatient Plan of Care  Goal: Plan of Care Review  Outcome: Ongoing, Progressing  Flowsheets (Taken 9/19/2023 1702)  Plan of Care Reviewed With: patient  Goal: Absence of Hospital-Acquired Illness or Injury  Outcome: Ongoing, Progressing  Intervention: Identify and Manage Fall Risk  Flowsheets (Taken 9/19/2023 1702)  Safety Promotion/Fall Prevention:   assistive device/personal item within reach   commode/urinal/bedpan at bedside   diversional activities provided   Fall Risk reviewed with patient/family   high risk medications identified   lighting adjusted   medications reviewed   nonskid shoes/socks when out of bed   side rails raised x 2  Intervention: Prevent Skin Injury  Flowsheets (Taken 9/19/2023 1702)  Skin Protection:   adhesive use limited   incontinence pads utilized   tubing/devices free from skin contact   transparent dressing maintained  Intervention: Prevent and Manage VTE (Venous Thromboembolism) Risk  Flowsheets (Taken 9/19/2023 1702)  VTE Prevention/Management:   ambulation promoted   bleeding precations maintained   bleeding risk assessed   bleeding risk factor(s) identified, provider notified   fluids promoted   dorsiflexion/plantar flexion performed   intravenous hydration  Range of Motion: active ROM (range of motion) encouraged  Intervention: Prevent Infection  Flowsheets (Taken 9/19/2023 1702)  Infection Prevention:   cohorting utilized   environmental surveillance performed   equipment surfaces disinfected   hand hygiene promoted   personal protective equipment utilized   rest/sleep promoted   single patient room provided  Goal: Optimal Comfort and Wellbeing  Outcome: Ongoing, Progressing  Intervention: Monitor Pain and Promote Comfort  Flowsheets (Taken 9/19/2023 1702)  Pain Management Interventions:   pain management plan reviewed with patient/caregiver   pillow support provided   quiet environment facilitated   relaxation techniques promoted  Intervention: Provide  Person-Centered Care  Flowsheets (Taken 9/19/2023 1702)  Trust Relationship/Rapport:   care explained   choices provided   questions answered   questions encouraged   reassurance provided   thoughts/feelings acknowledged     Problem: Infection  Goal: Absence of Infection Signs and Symptoms  Outcome: Ongoing, Progressing  Intervention: Prevent or Manage Infection  Flowsheets (Taken 9/19/2023 1702)  Infection Management: aseptic technique maintained  Isolation Precautions: enhanced contact

## 2023-09-19 NOTE — PROGRESS NOTES
Hospital Medicine  Progress Note    Patient Name: Jaiden Dillon  MRN: [id number]  Status: IP- Inpatient   Admission Date: 9/17/2023  Length of Stay: 1  Date of Service: 09/19/2023       CC: hospital follow-up for TORI       SUBJECTIVE   58 y.o. male with a history of HTN, DM, kidney stones, and anxiety presented to [facility] on 9/17 for generalized weakness, intermittent hypotension, shortness of breath, and chest pain.  Evaluation in ED noted patient hypotensive and tachycardic.  Labs noted BUN 45, creatinine 4.11, , and troponin undetectable.  UDS was positive for benzodiazepines, amphetamines, and cannabinoids.  UA revealed trace protein and glucose otherwise unremarkable.  EKG revealed heart rate of NSR at 86 bpm.  Patient was admitted to hospital medicine and initiated on IV fluid hydration.    Today: Patient seen and examined at bedside, and chart reviewed.  Renal function improved today, pressures in low normal range.      MEDICATIONS   Scheduled   [START ON 9/20/2023] aspirin  81 mg Oral Daily    atorvastatin  20 mg Oral QHS    clonazePAM  1 mg Oral BID    mirtazapine  30 mg Oral QHS    [START ON 9/20/2023] sertraline  50 mg Oral Daily     Continuous Infusions   lactated ringers 150 mL/hr at 09/19/23 1038         PHYSICAL EXAM   VITALS: T 97.5 °F (36.4 °C)   /72   P 72   RR 18   O2 99 %    GENERAL: Awake and in NAD  LUNGS: CTA B/L  CVS: Normal rate  GI/: Soft, NT, bowel sounds positive.  EXTREMITIES: No peripheral edema  NEURO: AAOx3  PSYCH: Cooperative      LABS   CBC  Recent Labs     09/18/23  0103 09/19/23  0521   WBC 9.48 6.64   RBC 4.60* 3.74*   HGB 14.1 11.3*   HCT 40.1* 33.4*   MCV 87.2 89.3   MCH 30.7 30.2   MCHC 35.2 33.8   RDW 13.2 13.4    217     CHEM  Recent Labs     09/18/23  0103 09/19/23  0521   * 139   K 3.4* 4.3   CHLORIDE 96* 104   CO2 24 29   BUN 45.6* 23.6   CREATININE 4.11* 0.87   GLUCOSE 110 97   CALCIUM 9.8 9.1   MG  --  1.60   PHOS  --  2.5   ALBUMIN 4.7  3.3*   GLOBULIN 4.0* 2.5   ALKPHOS 155* 108   ALT 40 25   AST 25 19   BILITOT 0.6 0.6         MICROBIOLOGY     Microbiology Results (last 7 days)       Procedure Component Value Units Date/Time    Clostridium Diff Toxin, A & B, EIA [[id number]]  (Normal) Collected: 09/19/23 0924    Order Status: Completed Specimen: Stool Updated: 09/19/23 1132     Clostridium Difficile GDH Antigen Negative     Clostridium Difficile Toxin A/B Negative    Stool Culture [[id number]] Collected: 09/19/23 0915    Order Status: Sent Specimen: Stool Updated: 09/19/23 1027              DIAGNOSTICS   US Retroperitoneal Complete  FINDINGS  Exam quality: adequate for evaluation  Right Kidney: The right kidney measures 10.3 cm x 4.4 cm x 5 cm. The right renal sinus echopattern is within normal limits. The corticomedullary junction distinction is intact. There are multiple tiny echogenic foci in the right kidney, which may reflect nonobstructing stones versus artifact. No cysts, masses or hydronephrosis is identified. Left Kidney: The left kidney measures 10 cm x 5.5 cm x 5.1 cm. The left renal sinus echopattern is within normal limits. The corticomedullary junction distinction is intact. Multiple echogenic foci are seen in the left kidney, the largest measures 4 mm in the mid pole, resembling nonobstructing stones versus artifact. No cysts, masses or hydronephrosis is identified. Urinary bladder: Pre-void volume measures 563 ml. The urinary bladder is distended. Bilateral ureteral jets are not demonstrated on the submitted images. Prostate: Unremarkable sonographic appearance.  Measures 4.2 cm x 4.3 cm x 2.5 cm. Miscellaneous: The visualized abdominal aorta appears unremarkable and measures 1.9 cm and 1.8 cm proximal and mid segments; the distal abdominal aorta is obscured by bowel gas. The inferior vena cava is unremarkable.  IMPRESSION  1. Findings suggestive of multiple left nonobstructing nephrolithiasis measuring up to 4 mm.  2. Possible  punctate nonobstructing right nephrolithiasis versus artifact.  3. Additional secondary details discussed above.  Electronically signed by: Tim Gilmore  Date:    09/18/2023  Time:    07:38      ASSESSMENT   TORI s/t IVVD  Hypokalemia  Gastroenteritis  Polysubstance abuse   Non-obstructive nephro-/ureterolithiasis  h/o essential hypertension  NIDDM II  h/o anxiety    PLAN   Continue with hydration and monitoring  Home medications reviewed and resumed as deemed appropriate  Continue to hold antihypertensives  Repeat labs in AM  Will re-assess then for possible discharge home        Prophylaxis: B/L SCDs        Freddy Bhatia MD  [facility]

## 2023-09-20 VITALS
RESPIRATION RATE: 18 BRPM | OXYGEN SATURATION: 98 % | DIASTOLIC BLOOD PRESSURE: 74 MMHG | WEIGHT: 135 LBS | TEMPERATURE: 98 F | BODY MASS INDEX: 20.46 KG/M2 | HEART RATE: 75 BPM | HEIGHT: 68 IN | SYSTOLIC BLOOD PRESSURE: 117 MMHG

## 2023-09-20 PROBLEM — N17.9 AKI (ACUTE KIDNEY INJURY): Status: ACTIVE | Noted: 2023-09-20

## 2023-09-20 PROBLEM — N17.9 AKI (ACUTE KIDNEY INJURY): Status: RESOLVED | Noted: 2023-09-20 | Resolved: 2023-09-20

## 2023-09-20 LAB
ANION GAP SERPL CALC-SCNC: 7 MEQ/L
BUN SERPL-MCNC: 16.3 MG/DL (ref 8.4–25.7)
CALCIUM SERPL-MCNC: 9.5 MG/DL (ref 8.8–10)
CHLORIDE SERPL-SCNC: 106 MMOL/L (ref 98–107)
CO2 SERPL-SCNC: 27 MMOL/L (ref 23–31)
CREAT SERPL-MCNC: 0.8 MG/DL (ref 0.73–1.18)
CREAT/UREA NIT SERPL: 20
GFR SERPLBLD CREATININE-BSD FMLA CKD-EPI: >60 MLS/MIN/1.73/M2
GLUCOSE SERPL-MCNC: 86 MG/DL (ref 82–115)
PATH REV: NORMAL
POTASSIUM SERPL-SCNC: 4.8 MMOL/L (ref 3.5–5.1)
SODIUM SERPL-SCNC: 140 MMOL/L (ref 136–145)

## 2023-09-20 PROCEDURE — 80048 BASIC METABOLIC PNL TOTAL CA: CPT | Performed by: INTERNAL MEDICINE

## 2023-09-20 PROCEDURE — 63600175 PHARM REV CODE 636 W HCPCS: Performed by: INTERNAL MEDICINE

## 2023-09-20 PROCEDURE — 25000003 PHARM REV CODE 250: Performed by: INTERNAL MEDICINE

## 2023-09-20 RX ADMIN — SERTRALINE HYDROCHLORIDE 50 MG: 50 TABLET ORAL at 08:09

## 2023-09-20 RX ADMIN — ASPIRIN 81 MG: 81 TABLET, COATED ORAL at 08:09

## 2023-09-20 RX ADMIN — SODIUM CHLORIDE, POTASSIUM CHLORIDE, SODIUM LACTATE AND CALCIUM CHLORIDE: 600; 310; 30; 20 INJECTION, SOLUTION INTRAVENOUS at 04:09

## 2023-09-20 RX ADMIN — CLONAZEPAM 1 MG: 1 TABLET ORAL at 08:09

## 2023-09-20 NOTE — PLAN OF CARE
Problem: Adult Inpatient Plan of Care  Goal: Plan of Care Review  9/20/2023 0459 by Caprice Jean Baptiste RN  Outcome: Ongoing, Progressing  9/20/2023 0344 by Caprice Jean Baptiste RN  Outcome: Ongoing, Progressing  Goal: Patient-Specific Goal (Individualized)  9/20/2023 0459 by Caprice Jean Baptiste RN  Outcome: Ongoing, Progressing  9/20/2023 0344 by Caprice Jean Baptiste RN  Outcome: Ongoing, Progressing  Goal: Absence of Hospital-Acquired Illness or Injury  9/20/2023 0459 by Caprice Jean Baptiste RN  Outcome: Ongoing, Progressing  9/20/2023 0344 by Caprice Jean Baptiste RN  Outcome: Ongoing, Progressing  Goal: Optimal Comfort and Wellbeing  9/20/2023 0459 by Caprice Jean Baptiste RN  Outcome: Ongoing, Progressing  9/20/2023 0344 by Caprice Jean Baptiste RN  Outcome: Ongoing, Progressing  Goal: Readiness for Transition of Care  9/20/2023 0459 by Caprice Jean Baptiste RN  Outcome: Ongoing, Progressing  9/20/2023 0344 by Caprice Jean Baptiste RN  Outcome: Ongoing, Progressing     Problem: Infection  Goal: Absence of Infection Signs and Symptoms  9/20/2023 0459 by Caprice Jean Baptiste RN  Outcome: Ongoing, Progressing  9/20/2023 0344 by Caprice Jean Baptiste RN  Outcome: Ongoing, Progressing

## 2023-09-20 NOTE — DISCHARGE SUMMARY
Hospital Medicine  Discharge Summary    Patient Name: Jaiden Dillon  MRN: [id number]  Admit Date: 9/17/2023  Discharge Date: 9/20/2023   Status: IP- Inpatient   Length of Stay: 2      PHYSICIANS   Admitting Physician: Alvaro Feliciano MD  Discharging Physician: Freddy Bhatia MD.  Primary Care Physician: Vish Feliciano FNP  Consults: None      DISCHARGE DIAGNOSES   TORI s/t IVVD  Hypokalemia  Gastroenteritis  Polysubstance abuse   Non-obstructive nephro-/ureterolithiasis  h/o essential hypertension  NIDDM II  h/o anxiety      PROCEDURES   None      HOSPITAL COURSE    58 y.o. male with a history of HTN, DM, kidney stones, and anxiety presented to [facility] on 9/17 for generalized weakness,  intermittent  hypotension, shortness of breath, and chest pain.  Evaluation in ED noted patient hypotensive and tachycardic.  Labs noted BUN 45, creatinine 4.11, , and troponin undetectable.  UDS was positive for benzodiazepines, amphetamines, and cannabinoids.  UA revealed trace protein and glucose otherwise unremarkable.  EKG revealed heart rate of NSR at 86 bpm.  Patient was admitted to hospital medicine and initiated on IV fluid hydration.  Renal function trended down nicely, and symptoms resolved.  Renal function now normalized, BUN down to 16, Cr 0.8.  Patient appropriate for discharge home.      STATUS  Improved    DISPOSITION  Discharge to home    DIET  Regular    ACTIVITY  As tolerated  Stay hydrated  Avoid drug use      FOLLOW-UP      Vish Feliciano FNP. Schedule an appointment as soon as possible for a visit in 1 week(s).    Specialty: Internal Medicine  Why: F/U appointment on Monday October 2, 2023 @ 2:15pm.  Contact information:  [address] [place] [address]  [phone]                 DISCHARGE MEDICATION RECONCILIATION     CONTINUE      aspirin 81 MG EC tablet  Commonly known as: ECOTRIN     atorvastatin 20 MG tablet  Commonly known as: LIPITOR     clonazePAM 1 MG tablet  Commonly known as:  KlonoPIN     mirtazapine 30 MG tablet  Commonly known as: REMERON     sertraline 50 MG tablet  Commonly known as: ZOLOFT     sildenafiL 50 MG tablet  Commonly known as: VIAGRA            DISCONTINUE     lisinopriL-hydrochlorothiazide 20-25 mg Tab  Commonly known as: PRINZIDE,ZESTORETIC         PHYSICAL EXAM   VITALS: T 97.7 °F (36.5 °C)   /74   P 75   RR 18   O2 98 %    GENERAL: Awake and in NAD  LUNGS: CTA B/L  CVS: Normal rate  GI/: Soft, NT, bowel sounds positive.  EXTREMITIES: No peripheral edema  NEURO: AAOx3  PSYCH: Cooperative        Discharge time: 33 minutes     Freddy Bhatia MD  [facility]       DIAGNOSITCS   CBC:   Recent Labs   Lab 09/18/23 0103 09/19/23  0521   WBC 9.48 6.64   HGB 14.1 11.3*   HCT 40.1* 33.4*    217     CMP:   Recent Labs   Lab 09/18/23 0103 09/19/23  0521 09/20/23  0406   CALCIUM 9.8 9.1 9.5   ALBUMIN 4.7 3.3*  --    * 139 140   K 3.4* 4.3 4.8   CO2 24 29 27   BUN 45.6* 23.6 16.3   CREATININE 4.11* 0.87 0.80   ALKPHOS 155* 108  --    ALT 40 25  --    AST 25 19  --    BILITOT 0.6 0.6  --    MG  --  1.60  --    PHOS  --  2.5  --      Estimated Creatinine Clearance: 85 mL/min (based on SCr of 0.8 mg/dL).    CARDIAC ENZYMES:   Recent Labs     09/18/23 0103 09/18/23  1153 09/18/23  1950   TROPONINI <0.010 <0.010 <0.010   *  --   --           Microbiology Results (last 7 days)       Procedure Component Value Units Date/Time    Stool Culture [[id number]]  (Normal) Collected: 09/19/23 0915    Order Status: Completed Specimen: Stool Updated: 09/20/23 0919     Stool Culture Negative for Salmonella, Shigella, Campylobacter, Vibrio, Aeromonas, Pleisiomonas,Yersinia, or Shiga Toxin 1 and 2.    Clostridium Diff Toxin, A & B, EIA [[id number]]  (Normal) Collected: 09/19/23 0901    Order Status: Completed Specimen: Stool Updated: 09/19/23 1132     Clostridium Difficile GDH Antigen Negative     Clostridium Difficile Toxin A/B Negative               X-Ray Chest 1  View  Result Date: 9/18/2023  EXAMINATION: XR CHEST 1 VIEW CLINICAL HISTORY: , Chest pain, unspecified. FINDINGS: No alveolar consolidation, effusion, or pneumothorax is seen.   The thoracic aorta is normal  cardiac silhouette, central pulmonary vessels and mediastinum are normal in size and are grossly unremarkable.   visualized osseous structures are grossly unremarkable.   Impression:  No acute chest disease is identified.   Electronically signed by: Harjinder Contreras Date:    09/18/2023 Time:    08:35    US Retroperitoneal Complete  Result Date: 9/18/2023  EXAMINATION US RETROPERITONEAL COMPLETE CLINICAL HISTORY elevated creatinine; TECHNIQUE Multiplanar grayscale sonographic images were obtained of the retroperitoneum and pelvis. COMPARISON None available at the time of initial interpretation. FINDINGS Exam quality: adequate for evaluation Right Kidney: The right kidney measures 10.3 cm x 4.4 cm x 5 cm. The right renal sinus echopattern is within normal limits. The corticomedullary junction distinction is intact. There are multiple tiny echogenic foci in the right kidney, which may reflect nonobstructing stones versus artifact. No cysts, masses or hydronephrosis is identified. Left Kidney: The left kidney measures 10 cm x 5.5 cm x 5.1 cm. The left renal sinus echopattern is within normal limits. The corticomedullary junction distinction is intact. Multiple echogenic foci are seen in the left kidney, the largest measures 4 mm in the mid pole, resembling nonobstructing stones versus artifact. No cysts, masses or hydronephrosis is identified. Urinary bladder: Pre-void volume measures 563 ml. The urinary bladder is distended. Bilateral ureteral jets are not demonstrated on the submitted images. Prostate: Unremarkable sonographic appearance.  Measures 4.2 cm x 4.3 cm x 2.5 cm. Miscellaneous: The visualized abdominal aorta appears unremarkable and measures 1.9 cm and 1.8 cm proximal and mid segments; the distal  abdominal aorta is obscured by bowel gas. The inferior vena cava is unremarkable.   Impression:  1. Findings suggestive of multiple left nonobstructing nephrolithiasis measuring up to 4 mm.   2. Possible punctate nonobstructing right nephrolithiasis versus artifact.   3. Additional secondary details discussed above.   Electronically signed by: Tim Gilmore Date:    09/18/2023 Time:    07:38

## 2023-09-21 LAB — BACTERIA STL CULT: NORMAL

## 2024-03-25 ENCOUNTER — LAB VISIT (OUTPATIENT)
Dept: LAB | Facility: HOSPITAL | Age: 59
End: 2024-03-25
Attending: NURSE PRACTITIONER
Payer: MEDICAID

## 2024-03-25 DIAGNOSIS — K52.9 INFLAMMATORY BOWEL DISEASE: Primary | ICD-10-CM

## 2024-03-25 DIAGNOSIS — I10 ESSENTIAL HYPERTENSION, MALIGNANT: ICD-10-CM

## 2024-03-25 DIAGNOSIS — R73.9 BLOOD GLUCOSE ELEVATED: ICD-10-CM

## 2024-03-25 DIAGNOSIS — E55.9 VITAMIN D DEFICIENCY: ICD-10-CM

## 2024-03-25 LAB
ALBUMIN SERPL-MCNC: 4 G/DL (ref 3.4–4.8)
ALBUMIN/GLOB SERPL: 1.1 RATIO (ref 1.1–2)
ALP SERPL-CCNC: 114 UNIT/L (ref 40–150)
ALT SERPL-CCNC: 15 UNIT/L (ref 0–55)
APPEARANCE UR: CLEAR
AST SERPL-CCNC: 15 UNIT/L (ref 5–34)
BASOPHILS # BLD AUTO: 0.04 X10(3)/MCL
BASOPHILS NFR BLD AUTO: 0.7 %
BILIRUB SERPL-MCNC: 0.2 MG/DL
BILIRUB UR QL STRIP.AUTO: NEGATIVE
BUN SERPL-MCNC: 19 MG/DL (ref 8.4–25.7)
CALCIUM SERPL-MCNC: 9.9 MG/DL (ref 8.8–10)
CHLORIDE SERPL-SCNC: 104 MMOL/L (ref 98–107)
CHOLEST SERPL-MCNC: 148 MG/DL
CHOLEST/HDLC SERPL: 3 {RATIO} (ref 0–5)
CO2 SERPL-SCNC: 29 MMOL/L (ref 23–31)
COLOR UR AUTO: YELLOW
CREAT SERPL-MCNC: 1.17 MG/DL (ref 0.73–1.18)
DEPRECATED CALCIDIOL+CALCIFEROL SERPL-MC: 20.2 NG/ML (ref 30–80)
EOSINOPHIL # BLD AUTO: 0.12 X10(3)/MCL (ref 0–0.9)
EOSINOPHIL NFR BLD AUTO: 2.1 %
ERYTHROCYTE [DISTWIDTH] IN BLOOD BY AUTOMATED COUNT: 13.6 % (ref 11.5–17)
EST. AVERAGE GLUCOSE BLD GHB EST-MCNC: 114 MG/DL
GFR SERPLBLD CREATININE-BSD FMLA CKD-EPI: >60 MLS/MIN/1.73/M2
GLOBULIN SER-MCNC: 3.5 GM/DL (ref 2.4–3.5)
GLUCOSE SERPL-MCNC: 101 MG/DL (ref 82–115)
GLUCOSE UR QL STRIP.AUTO: NEGATIVE
HBA1C MFR BLD: 5.6 %
HCT VFR BLD AUTO: 38.9 % (ref 42–52)
HDLC SERPL-MCNC: 55 MG/DL (ref 35–60)
HGB BLD-MCNC: 12.8 G/DL (ref 14–18)
IMM GRANULOCYTES # BLD AUTO: 0.01 X10(3)/MCL (ref 0–0.04)
IMM GRANULOCYTES NFR BLD AUTO: 0.2 %
KETONES UR QL STRIP.AUTO: NEGATIVE
LDLC SERPL CALC-MCNC: 74 MG/DL (ref 50–140)
LEUKOCYTE ESTERASE UR QL STRIP.AUTO: NEGATIVE
LYMPHOCYTES # BLD AUTO: 1.94 X10(3)/MCL (ref 0.6–4.6)
LYMPHOCYTES NFR BLD AUTO: 33.2 %
MCH RBC QN AUTO: 29.8 PG (ref 27–31)
MCHC RBC AUTO-ENTMCNC: 32.9 G/DL (ref 33–36)
MCV RBC AUTO: 90.5 FL (ref 80–94)
MONOCYTES # BLD AUTO: 0.42 X10(3)/MCL (ref 0.1–1.3)
MONOCYTES NFR BLD AUTO: 7.2 %
NEUTROPHILS # BLD AUTO: 3.31 X10(3)/MCL (ref 2.1–9.2)
NEUTROPHILS NFR BLD AUTO: 56.6 %
NITRITE UR QL STRIP.AUTO: NEGATIVE
NRBC BLD AUTO-RTO: 0 %
PH UR STRIP.AUTO: 6 [PH]
PLATELET # BLD AUTO: 229 X10(3)/MCL (ref 130–400)
PMV BLD AUTO: 11.5 FL (ref 7.4–10.4)
POTASSIUM SERPL-SCNC: 5 MMOL/L (ref 3.5–5.1)
PROT SERPL-MCNC: 7.5 GM/DL (ref 5.8–7.6)
PROT UR QL STRIP.AUTO: NEGATIVE
PSA SERPL-MCNC: 0.65 NG/ML
RBC # BLD AUTO: 4.3 X10(6)/MCL (ref 4.7–6.1)
RBC UR QL AUTO: NEGATIVE
SODIUM SERPL-SCNC: 144 MMOL/L (ref 136–145)
SP GR UR STRIP.AUTO: 1.01 (ref 1–1.03)
TRIGL SERPL-MCNC: 96 MG/DL (ref 34–140)
TSH SERPL-ACNC: 0.33 UIU/ML (ref 0.35–4.94)
UROBILINOGEN UR STRIP-ACNC: 0.2
VLDLC SERPL CALC-MCNC: 19 MG/DL
WBC # SPEC AUTO: 5.84 X10(3)/MCL (ref 4.5–11.5)

## 2024-03-25 PROCEDURE — 84443 ASSAY THYROID STIM HORMONE: CPT

## 2024-03-25 PROCEDURE — 84153 ASSAY OF PSA TOTAL: CPT

## 2024-03-25 PROCEDURE — 82306 VITAMIN D 25 HYDROXY: CPT

## 2024-03-25 PROCEDURE — 80061 LIPID PANEL: CPT

## 2024-03-25 PROCEDURE — 83036 HEMOGLOBIN GLYCOSYLATED A1C: CPT

## 2024-03-25 PROCEDURE — 85025 COMPLETE CBC W/AUTO DIFF WBC: CPT

## 2024-03-25 PROCEDURE — 80053 COMPREHEN METABOLIC PANEL: CPT

## 2024-03-25 PROCEDURE — 81003 URINALYSIS AUTO W/O SCOPE: CPT

## 2024-03-25 PROCEDURE — 36415 COLL VENOUS BLD VENIPUNCTURE: CPT

## 2024-07-18 ENCOUNTER — HOSPITAL ENCOUNTER (EMERGENCY)
Facility: HOSPITAL | Age: 59
Discharge: HOME | End: 2024-07-18
Attending: FAMILY MEDICINE
Payer: MEDICAID

## 2024-07-18 VITALS
TEMPERATURE: 97 F | HEIGHT: 68 IN | BODY MASS INDEX: 21.98 KG/M2 | DIASTOLIC BLOOD PRESSURE: 74 MMHG | HEART RATE: 60 BPM | RESPIRATION RATE: 16 BRPM | OXYGEN SATURATION: 98 % | WEIGHT: 145 LBS | SYSTOLIC BLOOD PRESSURE: 125 MMHG

## 2024-07-18 DIAGNOSIS — L08.9 SKIN INFECTION: Primary | ICD-10-CM

## 2024-07-18 PROCEDURE — 99284 EMERGENCY DEPT VISIT MOD MDM: CPT

## 2024-07-18 RX ORDER — LEVOFLOXACIN 750 MG/1
750 TABLET ORAL DAILY
Qty: 7 TABLET | Refills: 0 | Status: SHIPPED | OUTPATIENT
Start: 2024-07-18 | End: 2024-07-25

## 2024-07-18 RX ORDER — MUPIROCIN 20 MG/G
OINTMENT TOPICAL 3 TIMES DAILY
Qty: 30 G | Refills: 0 | Status: SHIPPED | OUTPATIENT
Start: 2024-07-18 | End: 2024-07-28

## 2024-07-18 RX ORDER — TRAMADOL HYDROCHLORIDE 50 MG/1
50 TABLET ORAL EVERY 6 HOURS PRN
Qty: 12 TABLET | Refills: 0 | Status: SHIPPED | OUTPATIENT
Start: 2024-07-18

## 2024-07-18 RX ORDER — METHYLPREDNISOLONE 4 MG/1
TABLET ORAL
Qty: 1 EACH | Refills: 0 | Status: SHIPPED | OUTPATIENT
Start: 2024-07-18 | End: 2024-08-08

## 2024-07-18 NOTE — ED NOTES
"Pt ambulated to ED room with steady gait. Reports multiple sores to scalp. Reports he has had these sores x 4 years. Says he has been diagnosed with scabes in the past and has never treated his house. Presents with ziplock "full of bugs" that "I picked off my head".  Objects in bag appear to be scabs in bag. No bugs visualized.   "

## 2024-07-18 NOTE — ED PROVIDER NOTES
Encounter Date: 7/18/2024       History     Chief Complaint   Patient presents with    Sore     Sores on head.  Reports he has been dealing with this problem for 4 years.       This patient is a 58-year-old male who comes in with sores on his head that he has had for a proximally 4 years.  He states that they go away for awhile and then they come back.  He actually states that they are all over his body but he states they heel and then they come back.  He believes that they are tiny insects that are coming out of the wounds but they appear to be scabs.  Patient has been to a dermatologist regarding this problem and they also state that these are scabs and not little tiny insect    The history is provided by the patient.     Review of patient's allergies indicates:  No Known Allergies  Past Medical History:   Diagnosis Date    Hypertension      History reviewed. No pertinent surgical history.  No family history on file.  Social History     Tobacco Use    Smoking status: Every Day     Current packs/day: 1.00     Types: Cigarettes    Smokeless tobacco: Never   Substance Use Topics    Alcohol use: Not Currently    Drug use: Yes     Types: Marijuana     Review of Systems   Constitutional: Negative.    HENT: Negative.     Respiratory: Negative.     Cardiovascular: Negative.    Endocrine: Negative.    Skin:  Positive for rash.   Neurological: Negative.    Psychiatric/Behavioral: Negative.         Physical Exam     Initial Vitals [07/18/24 1513]   BP Pulse Resp Temp SpO2   129/81 (!) 120 20 97.3 °F (36.3 °C) 97 %      MAP       --         Physical Exam    Nursing note and vitals reviewed.  Constitutional: Vital signs are normal. He appears well-developed and well-nourished.   HENT:   Head: Normocephalic.       Patient has multiple open wounds on his head, there is erythema and slight swelling as well.  Patient states that it is painful   Eyes: Pupils are equal, round, and reactive to light.   Neck:   Normal range of  motion.  Cardiovascular:  Normal rate and regular rhythm.           Pulmonary/Chest: Breath sounds normal.   Abdominal: Abdomen is soft. Bowel sounds are normal.   Musculoskeletal:         General: Normal range of motion.      Cervical back: Normal range of motion.     Neurological: He is alert and oriented to person, place, and time.   Skin: Skin is warm and dry.   Patient has scabs, ulcers, redness and healed wounds all over his body.   Psychiatric: He has a normal mood and affect.         ED Course   Procedures  Labs Reviewed - No data to display       Imaging Results    None          Medications - No data to display  Medical Decision Making  This patient is a 58 year old male who comes in with rash on his head and on his whole body.  Patient states that the lesions are active right now on his head and he has several open wounds that he states tiny insect come out of.  I did not witness any tiny insect and when he has in a bag looks to be scabs  Differential diagnosis:  Acute psychosis, scabies,                                      Clinical Impression:  Final diagnoses:  [L08.9] Skin infection (Primary)          ED Disposition Condition    Discharge Stable          ED Prescriptions       Medication Sig Dispense Start Date End Date Auth. Provider    methylPREDNISolone (MEDROL DOSEPACK) 4 mg tablet Take as directed 1 each 7/18/2024 8/8/2024 Maria Prince MD    levoFLOXacin (LEVAQUIN) 750 MG tablet Take 1 tablet (750 mg total) by mouth once daily. for 7 days 7 tablet 7/18/2024 7/25/2024 Maria Prince MD    traMADoL (ULTRAM) 50 mg tablet Take 1 tablet (50 mg total) by mouth every 6 (six) hours as needed for Pain. 12 tablet 7/18/2024 -- Maria Prince MD    mupirocin (BACTROBAN) 2 % ointment Apply topically 3 (three) times daily. for 10 days 30 g 7/18/2024 7/28/2024 Maria Prince MD          Follow-up Information       Follow up With Specialties Details Why Contact  Info    Vish Feliciano, P Internal Medicine Schedule an appointment as soon as possible for a visit in 3 days  [id number] [place] [address]  [phone]               Maria Prince MD  07/18/24 8966